# Patient Record
Sex: FEMALE | Race: WHITE
[De-identification: names, ages, dates, MRNs, and addresses within clinical notes are randomized per-mention and may not be internally consistent; named-entity substitution may affect disease eponyms.]

---

## 2017-12-16 ENCOUNTER — HOSPITAL ENCOUNTER (EMERGENCY)
Age: 32
Discharge: HOME | End: 2017-12-16
Payer: COMMERCIAL

## 2017-12-16 DIAGNOSIS — Z79.3: ICD-10-CM

## 2017-12-16 DIAGNOSIS — R09.82: Primary | ICD-10-CM

## 2017-12-16 PROCEDURE — 99201: CPT

## 2020-10-20 ENCOUNTER — HOSPITAL ENCOUNTER (OUTPATIENT)
Age: 35
End: 2020-10-20
Payer: COMMERCIAL

## 2020-10-20 DIAGNOSIS — Z03.818: Primary | ICD-10-CM

## 2020-10-20 PROCEDURE — 86328 IA NFCT AB SARSCOV2 COVID19: CPT

## 2021-10-10 ENCOUNTER — HOSPITAL ENCOUNTER (OUTPATIENT)
Dept: HOSPITAL 22 - INF | Age: 36
LOS: 1 days | Discharge: HOME | End: 2021-10-11
Payer: COMMERCIAL

## 2021-10-10 DIAGNOSIS — G43.909: Primary | ICD-10-CM

## 2021-10-10 PROCEDURE — 96372 THER/PROPH/DIAG INJ SC/IM: CPT

## 2021-10-11 VITALS — RESPIRATION RATE: 20 BRPM

## 2022-05-04 ENCOUNTER — HOSPITAL ENCOUNTER (OUTPATIENT)
Age: 37
End: 2022-05-04
Payer: COMMERCIAL

## 2022-05-04 DIAGNOSIS — Z97.5: Primary | ICD-10-CM

## 2022-05-04 PROCEDURE — 76830 TRANSVAGINAL US NON-OB: CPT

## 2022-06-02 ENCOUNTER — OFFICE VISIT (OUTPATIENT)
Dept: FAMILY MEDICINE CLINIC | Facility: CLINIC | Age: 37
End: 2022-06-02

## 2022-06-02 VITALS
BODY MASS INDEX: 45.85 KG/M2 | DIASTOLIC BLOOD PRESSURE: 85 MMHG | SYSTOLIC BLOOD PRESSURE: 140 MMHG | HEIGHT: 65 IN | WEIGHT: 275.2 LBS | RESPIRATION RATE: 20 BRPM | OXYGEN SATURATION: 98 % | TEMPERATURE: 98.3 F | HEART RATE: 89 BPM

## 2022-06-02 DIAGNOSIS — E78.1 HYPERTRIGLYCERIDEMIA: Primary | ICD-10-CM

## 2022-06-02 DIAGNOSIS — R03.0 WHITE COAT SYNDROME WITH HIGH BLOOD PRESSURE BUT WITHOUT HYPERTENSION: ICD-10-CM

## 2022-06-02 DIAGNOSIS — E66.01 CLASS 3 SEVERE OBESITY DUE TO EXCESS CALORIES WITHOUT SERIOUS COMORBIDITY WITH BODY MASS INDEX (BMI) OF 40.0 TO 44.9 IN ADULT: ICD-10-CM

## 2022-06-02 PROCEDURE — 99203 OFFICE O/P NEW LOW 30 MIN: CPT | Performed by: FAMILY MEDICINE

## 2022-06-02 NOTE — PROGRESS NOTES
"Chief Complaint   Patient presents with   • NP / establish PCP      Previous [place] pt    • weight loss efforts        Subjective      Kathy Trammell is a 36 y.o. who presents for her weight. Her OB/GYN recently started her on Contrave. She has lost approx. 5lbs in is on week 3 of the medication. She last had labs 1 year ago through her employer. She recalls elevated triglycerides.     Objective   Vital Signs:  /85   Pulse 89   Temp 98.3 °F (36.8 °C)   Resp 20   Ht 163.8 cm (64.5\")   Wt 125 kg (275 lb 3.2 oz)   SpO2 98%   BMI 46.51 kg/m²     Class 3 Severe Obesity (BMI >=40). Obesity-related health conditions include the following: none. Obesity is unchanged. BMI is is above average; BMI management plan is completed. We discussed low calorie, low carb based diet program, portion control, increasing exercise and pharmacologic options including Contrave.        Physical Exam  Constitutional:       Appearance: She is obese.   Neurological:      Mental Status: She is alert.   Psychiatric:         Mood and Affect: Mood normal.         Behavior: Behavior normal.         Thought Content: Thought content normal.          Result Review                     Assessment and Plan  Diagnoses and all orders for this visit:    1. Hypertriglyceridemia (Primary)  Comments:  Patient will get copy of labs through employer when these are done    2. Class 3 severe obesity due to excess calories without serious comorbidity with body mass index (BMI) of 40.0 to 44.9 in adult (HCC)  Comments:  Cont. Contrave  Assessment & Plan:  Patient's (Body mass index is 46.51 kg/m².) indicates that they are morbidly obese (BMI > 40 or > 35 with obesity - related health condition) with health conditions that include dyslipidemias . Weight is newly identified. BMI is is above average; BMI management plan is completed. We discussed low calorie, low carb based diet program, portion control, increasing exercise and pharmacologic options " including Contrave.       3. White coat syndrome with high blood pressure but without hypertension          Follow Up  Return if symptoms worsen or fail to improve.  Patient was given instructions and counseling regarding her condition or for health maintenance advice. Please see specific information pulled into the AVS if appropriate.

## 2022-06-02 NOTE — ASSESSMENT & PLAN NOTE
Patient's (Body mass index is 46.51 kg/m².) indicates that they are morbidly obese (BMI > 40 or > 35 with obesity - related health condition) with health conditions that include dyslipidemias . Weight is newly identified. BMI is is above average; BMI management plan is completed. We discussed low calorie, low carb based diet program, portion control, increasing exercise and pharmacologic options including Contrave.

## 2023-01-09 ENCOUNTER — HOSPITAL ENCOUNTER (EMERGENCY)
Age: 38
Discharge: HOME | End: 2023-01-09
Payer: SELF-PAY

## 2023-01-09 VITALS
DIASTOLIC BLOOD PRESSURE: 87 MMHG | RESPIRATION RATE: 18 BRPM | OXYGEN SATURATION: 100 % | HEART RATE: 88 BPM | SYSTOLIC BLOOD PRESSURE: 168 MMHG | TEMPERATURE: 97.88 F

## 2023-01-09 VITALS
SYSTOLIC BLOOD PRESSURE: 136 MMHG | DIASTOLIC BLOOD PRESSURE: 97 MMHG | OXYGEN SATURATION: 98 % | HEART RATE: 79 BPM | RESPIRATION RATE: 18 BRPM | TEMPERATURE: 98.06 F

## 2023-01-09 VITALS — BODY MASS INDEX: 46.3 KG/M2

## 2023-01-09 DIAGNOSIS — N20.1: Primary | ICD-10-CM

## 2023-01-09 DIAGNOSIS — N13.30: ICD-10-CM

## 2023-01-09 LAB
ALBUMIN LEVEL: 4.9 G/DL (ref 3.5–5)
ALBUMIN/GLOB SERPL: 1.3 {RATIO} (ref 1.1–1.8)
ALP ISO SERPL-ACNC: 98 U/L (ref 38–126)
ALT SERPLBLD-CCNC: 27 U/L (ref 12–78)
ANION GAP SERPL CALC-SCNC: 15.5 MEQ/L (ref 5–15)
AST SERPL QL: 30 U/L (ref 14–36)
BILIRUB UR STRIP-MCNC: (no result) MG/DL
BILIRUBIN,TOTAL: 0.6 MG/DL (ref 0.2–1.3)
BUN SERPL-MCNC: 11 MG/DL (ref 7–17)
CALCIUM SPEC-MCNC: 9.6 MG/DL (ref 8.4–10.2)
CHLORIDE SPEC-SCNC: 104 MMOL/L (ref 98–107)
CO2 SERPL-SCNC: 26 MMOL/L (ref 22–30)
COLOR UR: (no result)
CREAT BLD-SCNC: 0.8 MG/DL (ref 0.52–1.04)
CREATININE CLEARANCE ESTIMATED: 82 ML/MIN (ref 50–200)
ESTIMATED GLOMERULAR FILT RATE: 80 ML/MIN (ref 60–?)
GFR (AFRICAN AMERICAN): 97 ML/MIN (ref 60–?)
GLOBULIN SER CALC-MCNC: 3.8 G/DL (ref 1.3–3.2)
GLUCOSE: 114 MG/DL (ref 74–100)
HCT VFR BLD CALC: 44.6 % (ref 37–47)
HGB BLD-MCNC: 14.4 G/DL (ref 12.2–16.2)
MCHC RBC-ENTMCNC: 32.3 G/DL (ref 31.8–35.4)
MCV RBC: 85.2 FL (ref 81–99)
MEAN CORPUSCULAR HEMOGLOBIN: 27.5 PG (ref 27–31.2)
MICRO URNS: (no result)
PH UR: 7 [PH] (ref 5–8.5)
PLATELET # BLD: 286 K/MM3 (ref 142–424)
POTASSIUM: 3.5 MMOL/L (ref 3.5–5.1)
PROT SERPL-MCNC: 8.7 G/DL (ref 6.3–8.2)
PROT UR STRIP-MCNC: (no result) MG/DL
RBC # BLD AUTO: 5.23 M/MM3 (ref 4.2–5.4)
RBC #/AREA URNS HPF: (no result) #/HPF (ref 0–3)
SODIUM SPEC-SCNC: 142 MMOL/L (ref 136–145)
SP GR UR: 1.02 (ref 1–1.03)
UROBILINOGEN UR QL: 0.2 EU/DL
WBC # BLD AUTO: 9.8 K/MM3 (ref 4.8–10.8)

## 2023-01-09 PROCEDURE — 74177 CT ABD & PELVIS W/CONTRAST: CPT

## 2023-01-09 PROCEDURE — 96361 HYDRATE IV INFUSION ADD-ON: CPT

## 2023-01-09 PROCEDURE — 96374 THER/PROPH/DIAG INJ IV PUSH: CPT

## 2023-01-09 PROCEDURE — 81001 URINALYSIS AUTO W/SCOPE: CPT

## 2023-01-09 PROCEDURE — 99285 EMERGENCY DEPT VISIT HI MDM: CPT

## 2023-01-09 PROCEDURE — 80053 COMPREHEN METABOLIC PANEL: CPT

## 2023-01-09 PROCEDURE — 84703 CHORIONIC GONADOTROPIN ASSAY: CPT

## 2023-01-09 PROCEDURE — 85025 COMPLETE CBC W/AUTO DIFF WBC: CPT

## 2023-01-09 PROCEDURE — 96375 TX/PRO/DX INJ NEW DRUG ADDON: CPT

## 2023-09-14 ENCOUNTER — HOSPITAL ENCOUNTER (OUTPATIENT)
Age: 38
End: 2023-09-14
Payer: COMMERCIAL

## 2023-09-14 DIAGNOSIS — Z13.29: ICD-10-CM

## 2023-09-14 DIAGNOSIS — Z13.21: ICD-10-CM

## 2023-09-14 DIAGNOSIS — Z00.00: ICD-10-CM

## 2023-09-14 DIAGNOSIS — R10.9: Primary | ICD-10-CM

## 2023-09-14 DIAGNOSIS — Z11.59: ICD-10-CM

## 2023-09-14 DIAGNOSIS — Z11.4: ICD-10-CM

## 2023-09-14 DIAGNOSIS — R19.7: ICD-10-CM

## 2023-09-14 DIAGNOSIS — Z13.220: ICD-10-CM

## 2023-09-14 DIAGNOSIS — Z13.1: ICD-10-CM

## 2023-09-14 DIAGNOSIS — R11.10: ICD-10-CM

## 2023-09-14 LAB
25-OH VITAMIN D, TOTAL: 19.5 NG/ML (ref 30–100)
ALBUMIN LEVEL: 4.3 G/DL (ref 3.5–5)
ALBUMIN/GLOB SERPL: 1.3 {RATIO} (ref 1.1–1.8)
ALP ISO SERPL-ACNC: 82 U/L (ref 38–126)
ALT SERPLBLD-CCNC: 24 U/L (ref 12–78)
ANION GAP SERPL CALC-SCNC: 10.9 MEQ/L (ref 5–15)
AST SERPL QL: 22 U/L (ref 14–36)
BILIRUBIN,TOTAL: 0.6 MG/DL (ref 0.2–1.3)
BUN SERPL-MCNC: 12 MG/DL (ref 7–17)
CALCIUM SPEC-MCNC: 9.2 MG/DL (ref 8.4–10.2)
CHLORIDE SPEC-SCNC: 106 MMOL/L (ref 98–107)
CHOLEST SPEC-SCNC: 165 MG/DL (ref 140–200)
CO2 SERPL-SCNC: 26 MMOL/L (ref 22–30)
CREAT BLD-SCNC: 0.7 MG/DL (ref 0.52–1.04)
ESTIMATED GLOMERULAR FILT RATE: 93 ML/MIN (ref 60–?)
GFR (AFRICAN AMERICAN): 112 ML/MIN (ref 60–?)
GLOBULIN SER CALC-MCNC: 3.3 G/DL (ref 1.3–3.2)
GLUCOSE: 101 MG/DL (ref 74–100)
HBA1C MFR BLD: 5.4 % (ref 4–6)
HCT VFR BLD CALC: 45.5 % (ref 37–47)
HDLC SERPL-MCNC: 32 MG/DL (ref 40–60)
HGB BLD-MCNC: 14.2 G/DL (ref 12.2–16.2)
MCHC RBC-ENTMCNC: 31.2 G/DL (ref 31.8–35.4)
MCV RBC: 84.4 FL (ref 81–99)
MEAN CORPUSCULAR HEMOGLOBIN: 26.3 PG (ref 27–31.2)
PLATELET # BLD: 255 K/MM3 (ref 142–424)
POTASSIUM: 3.9 MMOL/L (ref 3.5–5.1)
PROT SERPL-MCNC: 7.6 G/DL (ref 6.3–8.2)
RBC # BLD AUTO: 5.4 M/MM3 (ref 4.2–5.4)
SODIUM SPEC-SCNC: 139 MMOL/L (ref 136–145)
T4 FREE SERPL-MCNC: 0.91 NG/DL (ref 0.78–2.19)
TRIGLYCERIDES: 194 MG/DL (ref 30–150)
TSH SERPL-ACNC: 2.3 UIU/ML (ref 0.47–4.68)
WBC # BLD AUTO: 8.3 K/MM3 (ref 4.8–10.8)

## 2023-09-14 PROCEDURE — 83036 HEMOGLOBIN GLYCOSYLATED A1C: CPT

## 2023-09-14 PROCEDURE — 86703 HIV-1/HIV-2 1 RESULT ANTBDY: CPT

## 2023-09-14 PROCEDURE — 83516 IMMUNOASSAY NONANTIBODY: CPT

## 2023-09-14 PROCEDURE — 84443 ASSAY THYROID STIM HORMONE: CPT

## 2023-09-14 PROCEDURE — 82306 VITAMIN D 25 HYDROXY: CPT

## 2023-09-14 PROCEDURE — 85025 COMPLETE CBC W/AUTO DIFF WBC: CPT

## 2023-09-14 PROCEDURE — 80061 LIPID PANEL: CPT

## 2023-09-14 PROCEDURE — G0432 EIA HIV-1/HIV-2 SCREEN: HCPCS

## 2023-09-14 PROCEDURE — 84439 ASSAY OF FREE THYROXINE: CPT

## 2023-09-14 PROCEDURE — 80053 COMPREHEN METABOLIC PANEL: CPT

## 2023-09-15 LAB
HIV-1 AB CHG: (no result)
HIV-2 AB CHG: (no result)
HIV1 RNA CHG: (no result)

## 2023-11-07 ENCOUNTER — HOSPITAL ENCOUNTER (OUTPATIENT)
Age: 38
End: 2023-11-07
Payer: COMMERCIAL

## 2023-11-07 DIAGNOSIS — Z02.1: Primary | ICD-10-CM

## 2023-11-07 PROCEDURE — 86765 RUBEOLA ANTIBODY: CPT

## 2023-11-07 PROCEDURE — 86735 MUMPS ANTIBODY: CPT

## 2023-11-07 PROCEDURE — 86762 RUBELLA ANTIBODY: CPT

## 2023-11-07 PROCEDURE — 86787 VARICELLA-ZOSTER ANTIBODY: CPT

## 2023-11-09 LAB
MEASLES ANTIBODIES, IGG: 23.1 AU/ML
MUMPS ABS, IGG: >300 AU/ML
RUBELLA ANTIBODIES, IGG: 5.94 INDEX

## 2023-11-30 ENCOUNTER — HOSPITAL ENCOUNTER (OUTPATIENT)
Age: 38
End: 2023-11-30
Payer: COMMERCIAL

## 2023-11-30 VITALS — BODY MASS INDEX: 48.4 KG/M2

## 2023-11-30 DIAGNOSIS — I10: ICD-10-CM

## 2023-11-30 DIAGNOSIS — E66.01: ICD-10-CM

## 2023-11-30 DIAGNOSIS — Z71.3: Primary | ICD-10-CM

## 2023-11-30 PROCEDURE — 97802 MEDICAL NUTRITION INDIV IN: CPT

## 2025-03-11 ENCOUNTER — HOSPITAL ENCOUNTER (OUTPATIENT)
Dept: HOSPITAL 22 - LAB.DROPOF | Age: 40
Discharge: HOME | End: 2025-03-11
Payer: COMMERCIAL

## 2025-03-11 DIAGNOSIS — R05.9: ICD-10-CM

## 2025-03-11 DIAGNOSIS — R53.83: ICD-10-CM

## 2025-03-11 DIAGNOSIS — R50.9: Primary | ICD-10-CM

## 2025-03-11 PROCEDURE — 87631 RESP VIRUS 3-5 TARGETS: CPT

## 2025-04-16 ENCOUNTER — HOSPITAL ENCOUNTER (OUTPATIENT)
Dept: HOSPITAL 22 - LAB.DROPOF | Age: 40
Discharge: HOME | End: 2025-04-16
Payer: COMMERCIAL

## 2025-04-16 DIAGNOSIS — E66.01: ICD-10-CM

## 2025-04-16 DIAGNOSIS — I10: ICD-10-CM

## 2025-04-16 DIAGNOSIS — R22.32: Primary | ICD-10-CM

## 2025-04-16 DIAGNOSIS — Z11.59: ICD-10-CM

## 2025-04-16 DIAGNOSIS — R53.83: ICD-10-CM

## 2025-04-16 DIAGNOSIS — Z13.1: ICD-10-CM

## 2025-04-16 DIAGNOSIS — Z11.4: ICD-10-CM

## 2025-04-16 DIAGNOSIS — Z13.21: ICD-10-CM

## 2025-04-16 LAB
25-OH VITAMIN D, TOTAL: 29.4 NG/ML (ref 30–100)
ALBUMIN LEVEL: 4.3 G/DL (ref 3.5–5)
ALBUMIN/GLOB SERPL: 1.3 {RATIO} (ref 1.1–1.8)
ALP ISO SERPL-ACNC: 74 U/L (ref 38–126)
ALT SERPLBLD-CCNC: 21 U/L (ref 12–78)
ANION GAP SERPL CALC-SCNC: 16.9 MEQ/L (ref 5–15)
AST SERPL QL: 22 U/L (ref 14–36)
BILIRUBIN,TOTAL: 0.5 MG/DL (ref 0.2–1.3)
BUN SERPL-MCNC: 12 MG/DL (ref 7–17)
CALCIUM SPEC-MCNC: 9.5 MG/DL (ref 8.4–10.2)
CAOX CRY URNS QL MICRO: (no result) /LPF
CHLORIDE SPEC-SCNC: 104 MMOL/L (ref 98–107)
CHOLEST SPEC-SCNC: 149 MG/DL (ref 140–200)
CO2 SERPL-SCNC: 25 MMOL/L (ref 22–30)
COLOR UR: YELLOW
ESTIMATED GLOMERULAR FILT RATE: 79 ML/MIN (ref 60–?)
GFR (AFRICAN AMERICAN): 96 ML/MIN (ref 60–?)
GLOBULIN SER CALC-MCNC: 3.2 G/DL (ref 1.3–3.2)
GLUCOSE: 86 MG/DL (ref 74–100)
HBA1C MFR BLD: 5.2 % (ref 4–6)
HCT VFR BLD AUTO: 41.1 % (ref 37–47)
HDLC SERPL ELPH-MCNC: 39 MG/DL (ref 40–60)
HEPATITIS C  AB QUAL. W/ RFX: NEGATIVE
IRON SERPL QL: 62 UG/DL (ref 37–170)
MCH RBC QN AUTO: 26.9 PG (ref 27–31.2)
MCHC RBC-ENTMCNC: 31.6 G/DL (ref 31.8–35.4)
MCV RBC: 85.1 FL (ref 81–99)
MICRO URNS: (no result)
NUCLEATED RED BLOOD CELLS %: 0 %
PLATELET # BLD: 239 K/MM3 (ref 142–424)
POTASSIUM: 3.9 MMOL/L (ref 3.5–5.1)
PROT SERPL-MCNC: 7.5 G/DL (ref 6.3–8.2)
PROT UR STRIP-MCNC: (no result) MG/DL
RBC # BLD AUTO: 4.83 M/MM3 (ref 4.2–5.4)
SODIUM SPEC-SCNC: 142 MMOL/L (ref 136–145)
SP GR UR: >= 1.03 (ref 1–1.03)
T4 FREE SERPL-MCNC: 1.07 NG/DL (ref 0.78–2.19)
TOTAL IRON BINDING CAPACITY: 336 UG/DL (ref 265–497)
TRIGLYCERIDES: 159 MG/DL (ref 30–150)
TSH SERPL-ACNC: 1.32 UIU/ML (ref 0.47–4.68)
UROBILINOGEN UR QL: 0.2 EU/DL
VITAMIN B12: 634 PG/ML (ref 239–931)
WBC # BLD AUTO: 8.6 K/MM3 (ref 4.8–10.8)

## 2025-04-16 PROCEDURE — 87389 HIV-1 AG W/HIV-1&-2 AB AG IA: CPT

## 2025-04-16 PROCEDURE — 83550 IRON BINDING TEST: CPT

## 2025-04-16 PROCEDURE — 83036 HEMOGLOBIN GLYCOSYLATED A1C: CPT

## 2025-04-16 PROCEDURE — 85025 COMPLETE CBC W/AUTO DIFF WBC: CPT

## 2025-04-16 PROCEDURE — 87086 URINE CULTURE/COLONY COUNT: CPT

## 2025-04-16 PROCEDURE — 80053 COMPREHEN METABOLIC PANEL: CPT

## 2025-04-16 PROCEDURE — 80061 LIPID PANEL: CPT

## 2025-04-16 PROCEDURE — 84439 ASSAY OF FREE THYROXINE: CPT

## 2025-04-16 PROCEDURE — 84156 ASSAY OF PROTEIN URINE: CPT

## 2025-04-16 PROCEDURE — 83540 ASSAY OF IRON: CPT

## 2025-04-16 PROCEDURE — 82728 ASSAY OF FERRITIN: CPT

## 2025-04-16 PROCEDURE — 86803 HEPATITIS C AB TEST: CPT

## 2025-04-16 PROCEDURE — 81001 URINALYSIS AUTO W/SCOPE: CPT

## 2025-04-16 PROCEDURE — 84443 ASSAY THYROID STIM HORMONE: CPT

## 2025-04-16 PROCEDURE — 82306 VITAMIN D 25 HYDROXY: CPT

## 2025-04-16 PROCEDURE — 82607 VITAMIN B-12: CPT

## 2025-05-05 ENCOUNTER — HOSPITAL ENCOUNTER (OUTPATIENT)
Dept: HOSPITAL 22 - RAD | Age: 40
Discharge: HOME | End: 2025-05-05
Payer: COMMERCIAL

## 2025-05-05 DIAGNOSIS — Z12.31: Primary | ICD-10-CM

## 2025-05-05 PROCEDURE — 77063 BREAST TOMOSYNTHESIS BI: CPT

## 2025-05-05 PROCEDURE — 77067 SCR MAMMO BI INCL CAD: CPT
